# Patient Record
Sex: FEMALE | Race: WHITE | Employment: FULL TIME | URBAN - METROPOLITAN AREA
[De-identification: names, ages, dates, MRNs, and addresses within clinical notes are randomized per-mention and may not be internally consistent; named-entity substitution may affect disease eponyms.]

---

## 2021-01-28 ENCOUNTER — IMMUNIZATION (OUTPATIENT)
Dept: NURSING | Facility: CLINIC | Age: 56
End: 2021-01-28
Payer: COMMERCIAL

## 2021-01-28 PROCEDURE — 0001A PR COVID VAC PFIZER DIL RECON 30 MCG/0.3 ML IM: CPT

## 2021-01-28 PROCEDURE — 91300 PR COVID VAC PFIZER DIL RECON 30 MCG/0.3 ML IM: CPT

## 2021-02-18 ENCOUNTER — IMMUNIZATION (OUTPATIENT)
Dept: NURSING | Facility: CLINIC | Age: 56
End: 2021-02-18
Attending: INTERNAL MEDICINE
Payer: COMMERCIAL

## 2021-02-18 PROCEDURE — 91300 PR COVID VAC PFIZER DIL RECON 30 MCG/0.3 ML IM: CPT

## 2021-02-18 PROCEDURE — 0002A PR COVID VAC PFIZER DIL RECON 30 MCG/0.3 ML IM: CPT

## 2021-02-20 ENCOUNTER — NURSE TRIAGE (OUTPATIENT)
Dept: NURSING | Facility: CLINIC | Age: 56
End: 2021-02-20

## 2021-02-20 NOTE — TELEPHONE ENCOUNTER
"\"I got vaccination on Feb.18 and yesterday in the night I developed a rash around my right eye and forehead and I got vaccination on my right arm.\" Julieta calling and stating that she received the 2nd dose of the Pfizer COVID-19 vaccine on 2/18. Patient states the rash started yesterday and has gotten worse today. Rash is red with oval shaped lesions. Patient denies fever or rash being painful/ tender to the touch.     Triage guidelines recommend to be seen in 24 hours. Patient inquired whether or not she could see a dermatologist. FNA advised patient there is concern for the rash being on the face and near eye and that it would be best if she would be seen in the next 24 hours. RN advised to call back with any changes, worsening of symptoms, and questions or concerns. Patient verbalized understanding of and agreement with plan and had no further questions.     Additional Information    Negative: [1] Difficulty breathing or swallowing AND [2] starts within 2 hours after injection    Negative: Sounds like a life-threatening emergency to the triager    Negative: Fever > 104 F (40 C)    Negative: Sounds like a severe, unusual reaction to the triager    Negative: [1] Redness or red streak around the injection site AND [2] started > 48 hours after getting vaccine AND [3] fever    Negative: [1] Fever > 101 F (38.3 C) AND [2] age > 60 AND [3] started > 48 hours after getting vaccine    Negative: [1] Fever > 100.0 F (37.8 C) AND [2] bedridden (e.g., nursing home patient, CVA, chronic illness, recovering from surgery) AND [3] started > 48 hours after getting vaccine    Negative: [1] Fever > 100.0 F (37.8 C) AND [2] diabetes mellitus or weak immune system (e.g., HIV positive, cancer chemo, splenectomy, organ transplant, chronic steroids) AND [3] started > 48 hours after getting vaccine    Negative: [1] Redness or red streak around the injection site AND [2] started > 48 hours after getting vaccine AND [3] no fever  " (Exception: red area < 1 inch or 2.5 cm wide)    Negative: [1] Pain, tenderness, or swelling at the injection site AND [2] over 3 days (72 hours) since vaccine AND [3] getting worse    Negative: Fever > 100.0 F (37.8 C) present > 3 days (72 hours)    Negative: [1] Fever > 100.0 F (37.8 C) AND [2] healthcare worker    Negative: [1] Pain, tenderness, or swelling at the injection site AND [2] lasts > 7 days    Negative: [1] Requesting COVID-19 vaccine AND [2] healthcare worker (e.g., EMS first responders, doctors, nurses)    Negative: [1] Requesting COVID-19 vaccine AND [2] resident of a long-term care facility (e.g., nursing home)    Negative: [1] Requesting COVID-19 vaccine AND [2] vaccine available in the community for this patient group    Negative: [1] Sudden onset of rash (within last 2 hours) AND [2] difficulty with breathing or swallowing    Negative: Sounds like a life-threatening emergency to the triager    Negative: [1] Localized purple or blood-colored spots or dots AND [2] not from injury or friction AND [3] fever    Negative: Patient sounds very sick or weak to the triager    Negative: [1] Red area or streak AND [2] fever    Negative: [1] Rash is painful to touch AND [2] fever    Negative: [1] Looks infected (spreading redness, pus) AND [2] large red area (> 2 in. or 5 cm)    Negative: [1] Looks infected (spreading redness, pus) AND [2] diabetes mellitus or weak immune system (e.g., HIV positive, cancer chemo, splenectomy, organ transplant, chronic steroids)    Negative: [1] Localized purple or blood-colored spots or dots AND [2] not from injury or friction AND [3] no fever    [1] Looks infected (spreading redness, pus) AND [2] no fever    Protocols used: CORONAVIRUS (COVID-19) VACCINE QUESTIONS AND [id number] 1.3, RASH OR REDNESS - [id number]    Zenia Sullivan RN  [facility]

## 2021-03-07 ENCOUNTER — HEALTH MAINTENANCE LETTER (OUTPATIENT)
Age: 56
End: 2021-03-07

## 2021-10-11 ENCOUNTER — HEALTH MAINTENANCE LETTER (OUTPATIENT)
Age: 56
End: 2021-10-11

## 2022-03-27 ENCOUNTER — HEALTH MAINTENANCE LETTER (OUTPATIENT)
Age: 57
End: 2022-03-27

## 2022-09-25 ENCOUNTER — HEALTH MAINTENANCE LETTER (OUTPATIENT)
Age: 57
End: 2022-09-25

## 2023-05-08 ENCOUNTER — HEALTH MAINTENANCE LETTER (OUTPATIENT)
Age: 58
End: 2023-05-08